# Patient Record
Sex: MALE | URBAN - METROPOLITAN AREA
[De-identification: names, ages, dates, MRNs, and addresses within clinical notes are randomized per-mention and may not be internally consistent; named-entity substitution may affect disease eponyms.]

---

## 2020-02-20 ENCOUNTER — OFFICE VISIT (OUTPATIENT)
Dept: URBAN - METROPOLITAN AREA CLINIC 23 | Facility: CLINIC | Age: 50
End: 2020-02-20
Payer: COMMERCIAL

## 2020-02-20 DIAGNOSIS — H43.393 OTHER VITREOUS OPACITIES, BILATERAL: Primary | ICD-10-CM

## 2020-02-20 DIAGNOSIS — H53.042 AMBLYOPIA SUSPECT, LEFT EYE: ICD-10-CM

## 2020-02-20 PROCEDURE — 92004 COMPRE OPH EXAM NEW PT 1/>: CPT | Performed by: OPTOMETRIST

## 2020-02-20 ASSESSMENT — INTRAOCULAR PRESSURE
OS: 17
OD: 16

## 2020-02-20 ASSESSMENT — KERATOMETRY
OS: 44.50
OD: 43.00

## 2020-02-20 NOTE — IMPRESSION/PLAN
Impression: Amblyopia suspect, left eye: H53.042. Plan: Discussed diagnosis in detail with patient. No treatment is required at this time. Reassured patient of current condition and treatment. Will continue to observe condition and or symptoms.